# Patient Record
Sex: MALE | Race: WHITE | NOT HISPANIC OR LATINO
[De-identification: names, ages, dates, MRNs, and addresses within clinical notes are randomized per-mention and may not be internally consistent; named-entity substitution may affect disease eponyms.]

---

## 2021-07-08 ENCOUNTER — APPOINTMENT (OUTPATIENT)
Dept: PULMONOLOGY | Facility: CLINIC | Age: 61
End: 2021-07-08
Payer: COMMERCIAL

## 2021-07-08 VITALS — WEIGHT: 285 LBS | BODY MASS INDEX: 36.57 KG/M2 | HEIGHT: 74 IN

## 2021-07-08 DIAGNOSIS — Z78.9 OTHER SPECIFIED HEALTH STATUS: ICD-10-CM

## 2021-07-08 DIAGNOSIS — G47.33 OBSTRUCTIVE SLEEP APNEA (ADULT) (PEDIATRIC): ICD-10-CM

## 2021-07-08 DIAGNOSIS — R25.1 TREMOR, UNSPECIFIED: ICD-10-CM

## 2021-07-08 PROBLEM — Z00.00 ENCOUNTER FOR PREVENTIVE HEALTH EXAMINATION: Status: ACTIVE | Noted: 2021-07-08

## 2021-07-08 PROCEDURE — 99243 OFF/OP CNSLTJ NEW/EST LOW 30: CPT | Mod: GT

## 2021-07-08 RX ORDER — COLCHICINE 0.6 MG/1
TABLET ORAL
Refills: 0 | Status: ACTIVE | COMMUNITY

## 2021-07-08 RX ORDER — CLONAZEPAM 2 MG/1
TABLET ORAL
Refills: 0 | Status: ACTIVE | COMMUNITY

## 2021-07-08 RX ORDER — PROPRANOLOL HYDROCHLORIDE 80 MG/1
TABLET ORAL
Refills: 0 | Status: ACTIVE | COMMUNITY

## 2021-07-08 RX ORDER — MIRTAZAPINE 7.5 MG/1
TABLET, FILM COATED ORAL
Refills: 0 | Status: ACTIVE | COMMUNITY

## 2021-07-08 RX ORDER — ALLOPURINOL 200 MG/1
TABLET ORAL
Refills: 0 | Status: ACTIVE | COMMUNITY

## 2021-07-08 NOTE — ASSESSMENT
[FreeTextEntry1] : 61-year-old man has mild obstructive sleep apnea.\par \par Most of the apnea episodes happened in supine position, so he will try to avoid the supine position while sleeping and try to lose weight.\par \par At this point this treatment should be enough for this mild obstructive sleep apnea, patient will call back  sleep center if symptoms persist to consider other options.

## 2021-07-08 NOTE — REVIEW OF SYSTEMS
[Snoring] : snoring [Witnessed Apneas] : witnessed apnea [Negative] : Psychiatric [EDS: ESS=____] : no daytime somnolence

## 2021-07-08 NOTE — REASON FOR VISIT
[Home] : at home, [unfilled] , at the time of the visit. [Medical Office: ([facility])___] : at the medical office located in  [Verbal consent obtained from patient] : the patient, [unfilled] [Initial Evaluation] : an initial evaluation [FreeTextEntry1] : sleep apnea

## 2021-07-08 NOTE — HISTORY OF PRESENT ILLNESS
[FreeTextEntry1] : Dr. Britton\par Dr. Lewis\par 61 year old man  with history of essential tremor is here in the sleep center to address sleep apnea.  Patient is not sleepy with [place] sleepiness score of 10.  Patient has very loud snoring which disturbs his wife, also has witnessed apneas.  Patient's bedtime is around mdinight wakes up in the morning around 9 AM.  He feels rested when he wakes up.  Patient drinks 2 cans of soda during the daytime. Patient does not have any headaches or nocturia.  He is not sleepy while driving.\par \par Due to above symptoms patient underwent a sleep study which showed mild obstructive sleep apnea with AHI of 12.\par \par Today we discussed the results in detail, most of the episodes happened only in supine position, he will try to do positional therapy and try to avoid supine position and also try to lose weight.\par